# Patient Record
(demographics unavailable — no encounter records)

---

## 2020-09-22 NOTE — DIAGNOSTIC IMAGING REPORT
EXAMINATION:  CHEST SINGLE (PORTABLE)    



INDICATION: Pneumonia



COMPARISON: None

     

FINDINGS:



LINES/TUBES:None



LUNGS:The lungs are well-inflated. No focal consolidation or pulmonary edema.



PLEURA:No pleural effusion or pneumothorax.



MEDIASTINUM:The cardiomediastinal silhouette appears normal in size and shape.



BONES/SOFT TISSUES:No acute osseous injury.



ABDOMEN:No free air under the diaphragm.





IMPRESSION: 

No focal pneumonia or pulmonary edema.



Signed by: Kameron Corona MD on 9/22/2020 11:17 AM

## 2020-09-22 NOTE — DIAGNOSTIC IMAGING REPORT
EXAM: CT Abdomen and Pelvis WITH intravenous contrast  



INDICATION: Abdominal pain



COMPARISON: Chest radiograph of the same day



TECHNIQUE: Abdomen and pelvis were scanned utilizing a multidetector helical

scanner from the lung base to the pubic symphysis after administration of IV

contrast. Coronal and sagittal reformations were obtained. Routine protocol was

performed. Scan was performed during portal venous phase.

     

IV CONTRAST: 100mL of Isovue 370

ORAL CONTRAST: Water



RADIATION DOSE:

Total DLP:  242 mGy*cm



Dose modulation, iterative reconstruction, and/or weight based adjustment of

the mA/kV was utilized to reduce the radiation dose to as low as reasonably

achievable. 



FINDINGS:

LOWER THORAX: Normal.



HEPATOBILIARY: No focal hepatic lesions.  No biliary ductal dilatation. The

gallbladder appears unremarkable.



SPLEEN: No splenomegaly.



PANCREAS: No focal masses or ductal dilatation.



ADRENALS: No adrenal nodules.

KIDNEYS/URETERS: No hydronephrosis, stones, or solid mass lesions. Bilateral

subcentimeter renal cysts.

PELVIC ORGANS/BLADDER: Unremarkable.



PERITONEUM / RETROPERITONEUM: No free air or fluid.

LYMPH NODES: No lymphadenopathy.

VESSELS: Unremarkable.



GI TRACT: No distention or wall thickening. Normal appendix.



BONES AND SOFT TISSUES: Unremarkable.



IMPRESSION: 

No acute findings in the abdomen or pelvis.



Signed by: Kameron Corona MD on 9/22/2020 12:33 PM

## 2020-09-22 NOTE — EMERGENCY DEPARTMENT NOTE
History of Present Illnes


History of Present Illness


Chief Complaint:  COVID PUI


History of Present Illness


This is a 19 year old  female arrives to the ED with suprapubic 

abdominal pain, patient also complaining of generalized malaise weakness and 

fever. Patient is tearful on exam. She is worried of having COVID.





Chief Complaint Comment 18 y/o female presents to ED with c/o R flank pain that 

radiates to the r suprapubic region, she also reports sore throat, malaise, 

nausea, chills, subjective fever. Pt reports she has hx of ovarian cysts, and 

uti's. Pt also reports she was going to get tested for covid today but came to 

ED instead.


Historian:  Patient


Arrival Mode:  Car


Onset (how long ago):  day(s)


Radiation:  Reports non-radiation


Severity:  mild


Chronicity:  new


Exacerbating factors:  none





Past Medical/Family History


Physician Review


I have reviewed the patient's past medical and family history.  Any updates have

been documented here.





Past Medical History


Recent Fever:  No


Clinical Suspicion of Infectio:  No


New/Unexplained Change in Ment:  No


Past Medical History:  UTI's, Anxiety, Depression


Other Medical History:  


KIDNEY INFECTIONS, OVARIAN CYSTS


Past Surgical History:  None





Social History


Physically hurt or threatened:  No





Review of Systems


Review of Systems


Constitutional:  Reports no symptoms


EENTM:  Reports no symptoms


Cardiovascular:  Reports no symptoms


Respiratory:  Reports no symptoms


Gastrointestinal:  Reports as per HPI, Reports abdominal pain


Genitourinary:  Reports no symptoms


Musculoskeletal:  Reports no symptoms


Integumentary:  Reports no symptoms


Neurological:  Reports no symptoms


Psychological:  Reports no symptoms


Endocrine:  Reports no symptoms


Hematological/Lymphatic:  Reports no symptoms





Physical Exam


Related Data


Allergies:  


Coded Allergies:  


     No Known Drug Allergies (Verified  Allergy, Unknown, 9/22/20)


Triage Vital Signs





Vital Signs








  Date Time  Temp Pulse Resp B/P (MAP) Pulse Ox O2 Delivery O2 Flow Rate FiO2


 


9/22/20 10:08 99.1 124 12 111/77 100 Room Air  








Vital signs reviewed:  Yes





Physical Exam


CONSTITUTIONAL





Constitutional:  Present well-developed, Present well-nourished


HENT


HENT:  Present normocephalic, Present atraumatic, Present oropharynx 

clear/moist, Present nose normal


HENT L/R:  Present left ext ear normal, Present right ext ear normal


EYES





Eyes:  Reports PERRL, Reports conjunctivae normal


NECK


Neck:  Present ROM normal


PULMONARY


Pulmonary:  Present effort normal, Present breath sounds normal


CARDIOVASCULAR





Cardiovascular:  Present regular rhythm, Present heart sounds normal, Present 

capillary refill normal, Present normal rate


GASTROINTESTINAL





Abdominal:  Present soft, Present bowel sounds normal, Present tender


GENITOURINARY





Genitourinary:  Present exam deferred


SKIN


Skin:  Present warm, Present dry


MUSCULOSKELETAL





Musculoskeletal:  Present ROM normal


NEUROLOGICAL





Neurological:  Present alert, Present oriented x 3, Present no gross motor or 

sensory deficits


PSYCHOLOGICAL


Psychological:  Present mood/affect normal, Present judgement normal





Assessment & Plan


Medical Decision Making


MDM


18 yo F arrived to the ED with complaints of abdominal pain, WBC elevation 

noted. Pt's imaging unremarkable. Pt with possible gastroenteritis- stable for 

d/c. 


Pt tolerating oral intake, stable for D/C.





Assessment & Plan


Final Impression:  


(1) Abdominal pain


Depart Disposition:  HOME, SELF-CARE


Last Vital Signs











  Date Time  Temp Pulse Resp B/P (MAP) Pulse Ox O2 Delivery O2 Flow Rate FiO2


 


9/22/20 10:08 99.1 124 12 111/77 100 Room Air  








Home Meds


Active Scripts


Ondansetron Hcl* (ZOFRAN*) 4 Mg Tablet, 4 MG SL Q6H PRN for NAUSEA, #14 MG 0 

Refills


   Prov:EZRA MCMULLEN,          9/22/20


Medications in the ED





Sodium Chloride 1,000 ml @  0 mls/hr Q0M STAT IV ;  Start 9/22/20 at 10:04;  

Stop 9/22/20 at 10:09;  Status DC


Morphine Sulfate 4 mg ONCE  PRN IV SEVERE PAIN (7-10);  Start 9/22/20 at 10:15; 

Stop 9/29/20 at 10:14


Ondansetron HCl 4 mg NOW  STAT IV ;  Start 9/22/20 at 10:04;  Stop 9/22/20 at 

10:29;  Status DC











EZRA MCMULLEN,             Sep 22, 2020 11:00